# Patient Record
Sex: MALE | Race: WHITE | Employment: UNEMPLOYED | URBAN - METROPOLITAN AREA
[De-identification: names, ages, dates, MRNs, and addresses within clinical notes are randomized per-mention and may not be internally consistent; named-entity substitution may affect disease eponyms.]

---

## 2017-07-26 ENCOUNTER — HOSPITAL ENCOUNTER (OUTPATIENT)
Dept: CT IMAGING | Age: 27
Discharge: HOME | End: 2017-07-26
Attending: CHIROPRACTOR
Payer: COMMERCIAL

## 2017-07-26 DIAGNOSIS — S49.91XA RIGHT SHOULDER INJURY: ICD-10-CM

## 2017-07-26 PROCEDURE — 76377 3D RENDER W/INTRP POSTPROCES: CPT | Performed by: CHIROPRACTOR

## 2017-07-26 PROCEDURE — 73200 CT UPPER EXTREMITY W/O DYE: CPT | Performed by: CHIROPRACTOR

## 2017-12-24 ENCOUNTER — OFFICE VISIT (OUTPATIENT)
Dept: FAMILY MEDICINE CLINIC | Facility: CLINIC | Age: 27
End: 2017-12-24

## 2017-12-24 VITALS
WEIGHT: 196 LBS | HEART RATE: 86 BPM | SYSTOLIC BLOOD PRESSURE: 110 MMHG | BODY MASS INDEX: 26.55 KG/M2 | HEIGHT: 72 IN | DIASTOLIC BLOOD PRESSURE: 60 MMHG | RESPIRATION RATE: 18 BRPM | OXYGEN SATURATION: 98 % | TEMPERATURE: 98 F

## 2017-12-24 DIAGNOSIS — L03.213 PRESEPTAL CELLULITIS OF RIGHT UPPER EYELID: Primary | ICD-10-CM

## 2017-12-24 PROCEDURE — 99202 OFFICE O/P NEW SF 15 MIN: CPT | Performed by: NURSE PRACTITIONER

## 2017-12-24 RX ORDER — ERYTHROMYCIN 5 MG/G
1 OINTMENT OPHTHALMIC EVERY 6 HOURS
Qty: 1 TUBE | Refills: 0 | Status: SHIPPED | OUTPATIENT
Start: 2017-12-24 | End: 2017-12-31

## 2017-12-24 RX ORDER — CLINDAMYCIN HYDROCHLORIDE 300 MG/1
300 CAPSULE ORAL 3 TIMES DAILY
Qty: 21 CAPSULE | Refills: 0 | Status: SHIPPED | OUTPATIENT
Start: 2017-12-24 | End: 2017-12-31

## 2017-12-24 NOTE — PROGRESS NOTES
CHIEF COMPLAINT:   Patient presents with:  Eye Problem: pt c\o of swollen rt eye lid, x1day      HPI:   Sabino Workman is a 24year old male who presents with right upper eyelid pain and swelling. Reports symptoms started 3 days ago.  Reports area is sen LYMPH: No preauricular lymphadenopathy.  No cervical lymphadenopathy    ASSESSMENT AND PLAN:   Rosa Quezada is a 24year old male who presents with:    ASSESSMENT:   Preseptal cellulitis of right upper eyelid  (primary encounter diagnosis)    PLAN: Med Call your healthcare provider right away if any of these occur:  · Increasing swelling or pain around the eye  · Increasing redness  · Changes in vision  · Fever of 100.4 (38º C) oral or 101.5 (38.6º C) rectal for more than 2 days on antibiotics  Date Last

## 2017-12-24 NOTE — PATIENT INSTRUCTIONS
Go to the ER if you have any increased redness, swelling, or fever or if you have any visual changes.  Follow up if you do not have any improvement of symptoms    Periorbital Cellulitis  Periorbital cellulitis is an infection of the tissues around the eye

## 2018-06-12 ENCOUNTER — HOSPITAL ENCOUNTER (OUTPATIENT)
Dept: GENERAL RADIOLOGY | Age: 28
Discharge: HOME | End: 2018-06-12
Attending: PHYSICIAN ASSISTANT
Payer: COMMERCIAL

## 2018-06-12 ENCOUNTER — IMAGING SERVICES (OUTPATIENT)
Dept: OTHER | Age: 28
End: 2018-06-12

## 2018-06-12 ENCOUNTER — CHARTING TRANS (OUTPATIENT)
Dept: OTHER | Age: 28
End: 2018-06-12

## 2018-06-12 DIAGNOSIS — R05.9 COUGH: ICD-10-CM

## 2018-06-12 PROCEDURE — 71046 X-RAY EXAM CHEST 2 VIEWS: CPT | Performed by: PHYSICIAN ASSISTANT

## 2018-06-13 ENCOUNTER — CHARTING TRANS (OUTPATIENT)
Dept: OTHER | Age: 28
End: 2018-06-13

## 2019-03-05 VITALS — WEIGHT: 193 LBS | HEART RATE: 92 BPM | BODY MASS INDEX: 26.14 KG/M2 | HEIGHT: 72 IN | TEMPERATURE: 98.2 F

## 2020-10-07 ENCOUNTER — TELEPHONE (OUTPATIENT)
Dept: SCHEDULING | Age: 30
End: 2020-10-07

## 2021-07-24 ENCOUNTER — OFFICE VISIT (OUTPATIENT)
Dept: FAMILY MEDICINE | Age: 31
End: 2021-07-24

## 2021-07-24 VITALS
HEIGHT: 72 IN | BODY MASS INDEX: 28.17 KG/M2 | SYSTOLIC BLOOD PRESSURE: 108 MMHG | DIASTOLIC BLOOD PRESSURE: 72 MMHG | HEART RATE: 82 BPM | RESPIRATION RATE: 16 BRPM | TEMPERATURE: 97.8 F | OXYGEN SATURATION: 99 % | WEIGHT: 208 LBS

## 2021-07-24 DIAGNOSIS — Z23 NEED FOR PROPHYLACTIC VACCINATION WITH COMBINED DIPHTHERIA-TETANUS-PERTUSSIS (DTAP) VACCINE: Primary | ICD-10-CM

## 2021-07-24 DIAGNOSIS — L73.9 FOLLICULITIS: ICD-10-CM

## 2021-07-24 DIAGNOSIS — R05.9 COUGH: ICD-10-CM

## 2021-07-24 DIAGNOSIS — Z00.01 ENCOUNTER FOR GENERAL ADULT MEDICAL EXAMINATION WITH ABNORMAL FINDINGS: ICD-10-CM

## 2021-07-24 PROCEDURE — 99213 OFFICE O/P EST LOW 20 MIN: CPT | Performed by: PHYSICIAN ASSISTANT

## 2021-07-24 PROCEDURE — 99395 PREV VISIT EST AGE 18-39: CPT | Performed by: PHYSICIAN ASSISTANT

## 2021-07-24 RX ORDER — CEFADROXIL 500 MG/1
500 CAPSULE ORAL 2 TIMES DAILY
Qty: 28 CAPSULE | Refills: 0 | Status: SHIPPED | OUTPATIENT
Start: 2021-07-24 | End: 2023-07-09 | Stop reason: ALTCHOICE

## 2021-07-24 ASSESSMENT — ENCOUNTER SYMPTOMS
NAUSEA: 0
EYE ITCHING: 0
SINUS PAIN: 0
POLYDIPSIA: 0
SORE THROAT: 0
CONSTIPATION: 0
ACTIVITY CHANGE: 0
CONFUSION: 0
SHORTNESS OF BREATH: 0
ABDOMINAL PAIN: 0
FACIAL ASYMMETRY: 0
FACIAL SWELLING: 0
RHINORRHEA: 0
UNEXPECTED WEIGHT CHANGE: 0
HEADACHES: 0
EYE PAIN: 0
POLYPHAGIA: 0
ABDOMINAL DISTENTION: 0
FATIGUE: 0
ROS SKIN COMMENTS: DRY SKIN
VOMITING: 0
FEVER: 0
AGITATION: 0
DIARRHEA: 0
EYE REDNESS: 0
DIZZINESS: 0
BLOOD IN STOOL: 0
COUGH: 1
SINUS PRESSURE: 0
NUMBNESS: 0
CHILLS: 0
CHEST TIGHTNESS: 0
LIGHT-HEADEDNESS: 0
WHEEZING: 0
BRUISES/BLEEDS EASILY: 0
APPETITE CHANGE: 0
DIAPHORESIS: 0
WEAKNESS: 0
COLOR CHANGE: 0
EYE DISCHARGE: 0
ADENOPATHY: 0

## 2021-07-24 ASSESSMENT — PATIENT HEALTH QUESTIONNAIRE - PHQ9
1. LITTLE INTEREST OR PLEASURE IN DOING THINGS: NOT AT ALL
SUM OF ALL RESPONSES TO PHQ9 QUESTIONS 1 AND 2: 0
2. FEELING DOWN, DEPRESSED OR HOPELESS: NOT AT ALL
CLINICAL INTERPRETATION OF PHQ2 SCORE: NO FURTHER SCREENING NEEDED
SUM OF ALL RESPONSES TO PHQ9 QUESTIONS 1 AND 2: 0
CLINICAL INTERPRETATION OF PHQ9 SCORE: NO FURTHER SCREENING NEEDED

## 2021-07-26 PROCEDURE — 90471 IMMUNIZATION ADMIN: CPT

## 2021-07-26 PROCEDURE — 90715 TDAP VACCINE 7 YRS/> IM: CPT

## 2021-08-09 ENCOUNTER — TELEPHONE (OUTPATIENT)
Dept: SCHEDULING | Age: 31
End: 2021-08-09

## 2021-08-10 ENCOUNTER — TELEPHONE (OUTPATIENT)
Dept: SCHEDULING | Age: 31
End: 2021-08-10

## 2021-08-26 ENCOUNTER — TELEPHONE (OUTPATIENT)
Dept: FAMILY MEDICINE | Age: 31
End: 2021-08-26

## 2021-08-27 ENCOUNTER — TELEPHONE (OUTPATIENT)
Dept: FAMILY MEDICINE | Age: 31
End: 2021-08-27

## 2021-09-27 ENCOUNTER — TELEPHONE (OUTPATIENT)
Dept: SCHEDULING | Age: 31
End: 2021-09-27

## 2021-10-04 ENCOUNTER — APPOINTMENT (OUTPATIENT)
Dept: URGENT CARE | Age: 31
End: 2021-10-04

## 2021-10-04 DIAGNOSIS — Z20.822 COVID-19 RULED OUT BY LABORATORY TESTING: Primary | ICD-10-CM

## 2021-10-04 PROCEDURE — U0005 INFEC AGEN DETEC AMPLI PROBE: HCPCS | Performed by: NURSE PRACTITIONER

## 2021-10-04 PROCEDURE — U0003 INFECTIOUS AGENT DETECTION BY NUCLEIC ACID (DNA OR RNA); SEVERE ACUTE RESPIRATORY SYNDROME CORONAVIRUS 2 (SARS-COV-2) (CORONAVIRUS DISEASE [COVID-19]), AMPLIFIED PROBE TECHNIQUE, MAKING USE OF HIGH THROUGHPUT TECHNOLOGIES AS DESCRIBED BY CMS-2020-01-R: HCPCS | Performed by: NURSE PRACTITIONER

## 2021-10-05 LAB
SARS-COV-2 RNA RESP QL NAA+PROBE: NOT DETECTED
SERVICE CMNT-IMP: NORMAL
SERVICE CMNT-IMP: NORMAL

## 2022-03-24 ENCOUNTER — DOCUMENTATION (OUTPATIENT)
Dept: URGENT CARE | Age: 32
End: 2022-03-24

## 2022-03-24 ENCOUNTER — TELEPHONE (OUTPATIENT)
Dept: SCHEDULING | Age: 32
End: 2022-03-24

## 2022-03-24 ENCOUNTER — APPOINTMENT (OUTPATIENT)
Dept: URGENT CARE | Age: 32
End: 2022-03-24

## 2022-09-16 ENCOUNTER — TELEPHONE (OUTPATIENT)
Dept: FAMILY MEDICINE | Age: 32
End: 2022-09-16

## 2023-01-24 ENCOUNTER — HOSPITAL ENCOUNTER (EMERGENCY)
Age: 33
Discharge: HOME | End: 2023-01-24
Attending: EMERGENCY MEDICINE
Payer: COMMERCIAL

## 2023-01-24 ENCOUNTER — APPOINTMENT (OUTPATIENT)
Dept: GENERAL RADIOLOGY | Age: 33
End: 2023-01-24
Attending: EMERGENCY MEDICINE
Payer: COMMERCIAL

## 2023-01-24 ENCOUNTER — NURSE TRIAGE (OUTPATIENT)
Dept: FAMILY MEDICINE | Age: 33
End: 2023-01-24

## 2023-01-24 VITALS
BODY MASS INDEX: 26.41 KG/M2 | OXYGEN SATURATION: 99 % | WEIGHT: 195 LBS | HEART RATE: 95 BPM | HEIGHT: 72 IN | DIASTOLIC BLOOD PRESSURE: 80 MMHG | TEMPERATURE: 98 F | SYSTOLIC BLOOD PRESSURE: 148 MMHG | RESPIRATION RATE: 24 BRPM

## 2023-01-24 DIAGNOSIS — R07.9 CHEST PAIN WITH LOW RISK FOR CARDIAC ETIOLOGY: Primary | ICD-10-CM

## 2023-01-24 LAB
ALBUMIN SERPL-MCNC: 4 G/DL (ref 3.4–5)
ALBUMIN/GLOB SERPL: 1.3 {RATIO} (ref 1–2)
ALP LIVER SERPL-CCNC: 74 U/L
ALT SERPL-CCNC: 25 U/L
ANION GAP SERPL CALC-SCNC: 8 MMOL/L (ref 0–18)
AST SERPL-CCNC: 18 U/L (ref 15–37)
BASOPHILS # BLD AUTO: 0.05 X10(3) UL (ref 0–0.2)
BASOPHILS NFR BLD AUTO: 0.5 %
BILIRUB SERPL-MCNC: 0.5 MG/DL (ref 0.1–2)
BUN BLD-MCNC: 13 MG/DL (ref 7–18)
CALCIUM BLD-MCNC: 9 MG/DL (ref 8.5–10.1)
CHLORIDE SERPL-SCNC: 103 MMOL/L (ref 98–112)
CO2 SERPL-SCNC: 26 MMOL/L (ref 21–32)
CREAT BLD-MCNC: 1.3 MG/DL
D DIMER PPP FEU-MCNC: <0.27 UG/ML FEU (ref ?–0.5)
EOSINOPHIL # BLD AUTO: 0.15 X10(3) UL (ref 0–0.7)
EOSINOPHIL NFR BLD AUTO: 1.6 %
ERYTHROCYTE [DISTWIDTH] IN BLOOD BY AUTOMATED COUNT: 12 %
GFR SERPLBLD BASED ON 1.73 SQ M-ARVRAT: 76 ML/MIN/1.73M2 (ref 60–?)
GLOBULIN PLAS-MCNC: 3.1 G/DL (ref 2.8–4.4)
GLUCOSE BLD-MCNC: 174 MG/DL (ref 70–99)
HCT VFR BLD AUTO: 44.9 %
HGB BLD-MCNC: 15.3 G/DL
IMM GRANULOCYTES # BLD AUTO: 0.04 X10(3) UL (ref 0–1)
IMM GRANULOCYTES NFR BLD: 0.4 %
LYMPHOCYTES # BLD AUTO: 1.39 X10(3) UL (ref 1–4)
LYMPHOCYTES NFR BLD AUTO: 14.5 %
MCH RBC QN AUTO: 29.9 PG (ref 26–34)
MCHC RBC AUTO-ENTMCNC: 34.1 G/DL (ref 31–37)
MCV RBC AUTO: 87.9 FL
MONOCYTES # BLD AUTO: 0.57 X10(3) UL (ref 0.1–1)
MONOCYTES NFR BLD AUTO: 5.9 %
NEUTROPHILS # BLD AUTO: 7.38 X10 (3) UL (ref 1.5–7.7)
NEUTROPHILS # BLD AUTO: 7.38 X10(3) UL (ref 1.5–7.7)
NEUTROPHILS NFR BLD AUTO: 77.1 %
OSMOLALITY SERPL CALC.SUM OF ELEC: 288 MOSM/KG (ref 275–295)
PLATELET # BLD AUTO: 267 10(3)UL (ref 150–450)
POTASSIUM SERPL-SCNC: 3.7 MMOL/L (ref 3.5–5.1)
PROT SERPL-MCNC: 7.1 G/DL (ref 6.4–8.2)
RBC # BLD AUTO: 5.11 X10(6)UL
SODIUM SERPL-SCNC: 137 MMOL/L (ref 136–145)
TROPONIN I HIGH SENSITIVITY: 4 NG/L
WBC # BLD AUTO: 9.6 X10(3) UL (ref 4–11)

## 2023-01-24 PROCEDURE — 99284 EMERGENCY DEPT VISIT MOD MDM: CPT

## 2023-01-24 PROCEDURE — 36415 COLL VENOUS BLD VENIPUNCTURE: CPT

## 2023-01-24 PROCEDURE — 93010 ELECTROCARDIOGRAM REPORT: CPT

## 2023-01-24 PROCEDURE — 93005 ELECTROCARDIOGRAM TRACING: CPT

## 2023-01-24 PROCEDURE — 85025 COMPLETE CBC W/AUTO DIFF WBC: CPT | Performed by: EMERGENCY MEDICINE

## 2023-01-24 PROCEDURE — 80053 COMPREHEN METABOLIC PANEL: CPT | Performed by: EMERGENCY MEDICINE

## 2023-01-24 PROCEDURE — 85379 FIBRIN DEGRADATION QUANT: CPT | Performed by: EMERGENCY MEDICINE

## 2023-01-24 PROCEDURE — 99285 EMERGENCY DEPT VISIT HI MDM: CPT

## 2023-01-24 PROCEDURE — 84484 ASSAY OF TROPONIN QUANT: CPT | Performed by: EMERGENCY MEDICINE

## 2023-01-24 PROCEDURE — 71045 X-RAY EXAM CHEST 1 VIEW: CPT | Performed by: EMERGENCY MEDICINE

## 2023-01-25 LAB
ATRIAL RATE: 108 BPM
P AXIS: 16 DEGREES
P-R INTERVAL: 148 MS
Q-T INTERVAL: 324 MS
QRS DURATION: 108 MS
QTC CALCULATION (BEZET): 434 MS
R AXIS: 36 DEGREES
T AXIS: 35 DEGREES
VENTRICULAR RATE: 108 BPM

## 2023-01-25 NOTE — ED INITIAL ASSESSMENT (HPI)
Pt states 2 hours ago he started to feel short of breath and have chest tightness.  SOB resolved but left sided tightness is 2/10 at this time non-radiating

## 2023-07-03 ENCOUNTER — OFFICE VISIT (OUTPATIENT)
Dept: FAMILY MEDICINE | Age: 33
End: 2023-07-03

## 2023-07-03 VITALS
HEART RATE: 120 BPM | WEIGHT: 190.8 LBS | SYSTOLIC BLOOD PRESSURE: 122 MMHG | BODY MASS INDEX: 25.84 KG/M2 | HEIGHT: 72 IN | TEMPERATURE: 98.6 F | OXYGEN SATURATION: 98 % | DIASTOLIC BLOOD PRESSURE: 68 MMHG

## 2023-07-03 DIAGNOSIS — L73.9 FOLLICULITIS: Primary | ICD-10-CM

## 2023-07-03 DIAGNOSIS — F41.9 ANXIETY: ICD-10-CM

## 2023-07-03 PROCEDURE — 99213 OFFICE O/P EST LOW 20 MIN: CPT | Performed by: PHYSICIAN ASSISTANT

## 2023-07-03 RX ORDER — MINOCYCLINE HYDROCHLORIDE 100 MG/1
100 CAPSULE ORAL 2 TIMES DAILY
Qty: 30 CAPSULE | Refills: 0 | Status: SHIPPED | OUTPATIENT
Start: 2023-07-03 | End: 2023-07-18

## 2023-07-03 RX ORDER — VALACYCLOVIR HYDROCHLORIDE 1 G/1
1000 TABLET, FILM COATED ORAL 3 TIMES DAILY
Qty: 21 TABLET | Refills: 0 | Status: SHIPPED | OUTPATIENT
Start: 2023-07-03 | End: 2023-07-10

## 2023-07-18 ENCOUNTER — TELEPHONE (OUTPATIENT)
Dept: FAMILY MEDICINE | Age: 33
End: 2023-07-18

## 2023-07-18 RX ORDER — HYDROXYZINE HYDROCHLORIDE 10 MG/1
10 TABLET, FILM COATED ORAL EVERY 8 HOURS PRN
Qty: 90 TABLET | Refills: 3 | Status: SHIPPED | OUTPATIENT
Start: 2023-07-18

## 2024-07-24 ENCOUNTER — APPOINTMENT (OUTPATIENT)
Dept: URGENT CARE | Age: 34
End: 2024-07-24